# Patient Record
Sex: FEMALE | Race: AMERICAN INDIAN OR ALASKA NATIVE
[De-identification: names, ages, dates, MRNs, and addresses within clinical notes are randomized per-mention and may not be internally consistent; named-entity substitution may affect disease eponyms.]

---

## 2018-06-11 ENCOUNTER — HOSPITAL ENCOUNTER (EMERGENCY)
Dept: HOSPITAL 5 - ED | Age: 78
Discharge: LEFT BEFORE DISCHARGE | End: 2018-06-11
Payer: MEDICARE

## 2018-06-11 VITALS — SYSTOLIC BLOOD PRESSURE: 141 MMHG | DIASTOLIC BLOOD PRESSURE: 62 MMHG

## 2018-06-11 DIAGNOSIS — Z90.49: ICD-10-CM

## 2018-06-11 DIAGNOSIS — Z95.0: ICD-10-CM

## 2018-06-11 DIAGNOSIS — Z88.0: ICD-10-CM

## 2018-06-11 DIAGNOSIS — Z53.21: ICD-10-CM

## 2018-06-11 DIAGNOSIS — R53.1: ICD-10-CM

## 2018-06-11 DIAGNOSIS — T80.89XA: Primary | ICD-10-CM

## 2018-09-23 ENCOUNTER — HOSPITAL ENCOUNTER (INPATIENT)
Dept: HOSPITAL 5 - ED | Age: 78
LOS: 3 days | DRG: 871 | End: 2018-09-26
Attending: INTERNAL MEDICINE | Admitting: INTERNAL MEDICINE
Payer: MEDICARE

## 2018-09-23 DIAGNOSIS — Z87.891: ICD-10-CM

## 2018-09-23 DIAGNOSIS — G93.41: ICD-10-CM

## 2018-09-23 DIAGNOSIS — Z95.5: ICD-10-CM

## 2018-09-23 DIAGNOSIS — Z95.0: ICD-10-CM

## 2018-09-23 DIAGNOSIS — Z79.899: ICD-10-CM

## 2018-09-23 DIAGNOSIS — A41.9: Primary | ICD-10-CM

## 2018-09-23 DIAGNOSIS — J18.1: ICD-10-CM

## 2018-09-23 DIAGNOSIS — K52.9: ICD-10-CM

## 2018-09-23 DIAGNOSIS — E87.6: ICD-10-CM

## 2018-09-23 DIAGNOSIS — E83.42: ICD-10-CM

## 2018-09-23 DIAGNOSIS — Z85.79: ICD-10-CM

## 2018-09-23 DIAGNOSIS — E11.22: ICD-10-CM

## 2018-09-23 DIAGNOSIS — Z88.0: ICD-10-CM

## 2018-09-23 DIAGNOSIS — I25.2: ICD-10-CM

## 2018-09-23 DIAGNOSIS — I21.A1: ICD-10-CM

## 2018-09-23 DIAGNOSIS — J96.01: ICD-10-CM

## 2018-09-23 DIAGNOSIS — N17.0: ICD-10-CM

## 2018-09-23 DIAGNOSIS — N18.9: ICD-10-CM

## 2018-09-23 DIAGNOSIS — I13.0: ICD-10-CM

## 2018-09-23 DIAGNOSIS — F03.90: ICD-10-CM

## 2018-09-23 DIAGNOSIS — D64.9: ICD-10-CM

## 2018-09-23 DIAGNOSIS — I25.10: ICD-10-CM

## 2018-09-23 DIAGNOSIS — E87.8: ICD-10-CM

## 2018-09-23 DIAGNOSIS — E43: ICD-10-CM

## 2018-09-23 DIAGNOSIS — Z79.82: ICD-10-CM

## 2018-09-23 DIAGNOSIS — L89.159: ICD-10-CM

## 2018-09-23 DIAGNOSIS — D61.818: ICD-10-CM

## 2018-09-23 DIAGNOSIS — Z90.49: ICD-10-CM

## 2018-09-23 DIAGNOSIS — K92.2: ICD-10-CM

## 2018-09-23 DIAGNOSIS — I48.92: ICD-10-CM

## 2018-09-23 DIAGNOSIS — R65.21: ICD-10-CM

## 2018-09-23 DIAGNOSIS — N39.0: ICD-10-CM

## 2018-09-23 DIAGNOSIS — I50.9: ICD-10-CM

## 2018-09-23 DIAGNOSIS — I46.9: ICD-10-CM

## 2018-09-23 DIAGNOSIS — I49.3: ICD-10-CM

## 2018-09-23 DIAGNOSIS — Z90.710: ICD-10-CM

## 2018-09-23 DIAGNOSIS — I48.0: ICD-10-CM

## 2018-09-23 DIAGNOSIS — I82.C22: ICD-10-CM

## 2018-09-23 DIAGNOSIS — E88.09: ICD-10-CM

## 2018-09-23 PROCEDURE — 96375 TX/PRO/DX INJ NEW DRUG ADDON: CPT

## 2018-09-23 PROCEDURE — 31500 INSERT EMERGENCY AIRWAY: CPT

## 2018-09-23 PROCEDURE — 94760 N-INVAS EAR/PLS OXIMETRY 1: CPT

## 2018-09-23 PROCEDURE — 83690 ASSAY OF LIPASE: CPT

## 2018-09-23 PROCEDURE — A9558 XE133 XENON 10MCI: HCPCS

## 2018-09-23 PROCEDURE — 85025 COMPLETE CBC W/AUTO DIFF WBC: CPT

## 2018-09-23 PROCEDURE — 80048 BASIC METABOLIC PNL TOTAL CA: CPT

## 2018-09-23 PROCEDURE — 74176 CT ABD & PELVIS W/O CONTRAST: CPT

## 2018-09-23 PROCEDURE — 82140 ASSAY OF AMMONIA: CPT

## 2018-09-23 PROCEDURE — 96365 THER/PROPH/DIAG IV INF INIT: CPT

## 2018-09-23 PROCEDURE — 93970 EXTREMITY STUDY: CPT

## 2018-09-23 PROCEDURE — 78582 LUNG VENTILAT&PERFUS IMAGING: CPT

## 2018-09-23 PROCEDURE — 87205 SMEAR GRAM STAIN: CPT

## 2018-09-23 PROCEDURE — 82803 BLOOD GASES ANY COMBINATION: CPT

## 2018-09-23 PROCEDURE — 85610 PROTHROMBIN TIME: CPT

## 2018-09-23 PROCEDURE — 80053 COMPREHEN METABOLIC PANEL: CPT

## 2018-09-23 PROCEDURE — 96376 TX/PRO/DX INJ SAME DRUG ADON: CPT

## 2018-09-23 PROCEDURE — 82962 GLUCOSE BLOOD TEST: CPT

## 2018-09-23 PROCEDURE — 85730 THROMBOPLASTIN TIME PARTIAL: CPT

## 2018-09-23 PROCEDURE — 85007 BL SMEAR W/DIFF WBC COUNT: CPT

## 2018-09-23 PROCEDURE — 93010 ELECTROCARDIOGRAM REPORT: CPT

## 2018-09-23 PROCEDURE — 36600 WITHDRAWAL OF ARTERIAL BLOOD: CPT

## 2018-09-23 PROCEDURE — 93306 TTE W/DOPPLER COMPLETE: CPT

## 2018-09-23 PROCEDURE — 82550 ASSAY OF CK (CPK): CPT

## 2018-09-23 PROCEDURE — 85379 FIBRIN DEGRADATION QUANT: CPT

## 2018-09-23 PROCEDURE — 71045 X-RAY EXAM CHEST 1 VIEW: CPT

## 2018-09-23 PROCEDURE — 94002 VENT MGMT INPAT INIT DAY: CPT

## 2018-09-23 PROCEDURE — 83880 ASSAY OF NATRIURETIC PEPTIDE: CPT

## 2018-09-23 PROCEDURE — 87070 CULTURE OTHR SPECIMN AEROBIC: CPT

## 2018-09-23 PROCEDURE — 70450 CT HEAD/BRAIN W/O DYE: CPT

## 2018-09-23 PROCEDURE — 83735 ASSAY OF MAGNESIUM: CPT

## 2018-09-23 PROCEDURE — 93005 ELECTROCARDIOGRAM TRACING: CPT

## 2018-09-23 PROCEDURE — 80061 LIPID PANEL: CPT

## 2018-09-23 PROCEDURE — 92950 HEART/LUNG RESUSCITATION CPR: CPT

## 2018-09-23 PROCEDURE — 81001 URINALYSIS AUTO W/SCOPE: CPT

## 2018-09-23 PROCEDURE — 36415 COLL VENOUS BLD VENIPUNCTURE: CPT

## 2018-09-23 PROCEDURE — 87040 BLOOD CULTURE FOR BACTERIA: CPT

## 2018-09-23 PROCEDURE — 84484 ASSAY OF TROPONIN QUANT: CPT

## 2018-09-23 PROCEDURE — A9540 TC99M MAA: HCPCS

## 2018-09-23 PROCEDURE — 82553 CREATINE MB FRACTION: CPT

## 2018-09-23 NOTE — EMERGENCY DEPARTMENT REPORT
HPI





- General


Time Seen by Provider: 18 22:02





- HPI


HPI: 





Room 18





The patient is an 78-year-old female presenting with a chief complaint of 

altered mental status.  The patient is a resident at a nursing home and family 

states the patient last known well time was 2018.  The daughter states 

she did not check on the patient yesterday but today at 11:30 shows that the 

patient was not behaving like herself that she was not speaking and appears 

over her eyes rolled in the back of her head.  Patient opens eyes and makes eye 

contact but will not answer questions to provide history.





Location: Mental status


Duration: [See above]


Quality: Altered


Severity: Moderate


Modifying factors: [see above]


Context: [see above]


Mode of transportation: [not driving]





ED Past Medical Hx





- Past Medical History


Hx Hypertension: Yes


Hx Heart Attack/AMI: Yes


Hx Congestive Heart Failure: Yes


Hx Diabetes: Yes (diet)


Hx of Cancer: Yes (multiple myeloma)


Additional medical history: Heart problems; multiple myeloma





- Surgical History


Hx Coronary Stent: Yes


Hx Appendectomy: Yes


Additional Surgical History: Pacemaker, Hysterectomy, Bladder surgery; jaw 

surgery





- Family History


Family history: no significant





- Social History


Smoking Status: Former Smoker (none 20 years)


Substance Use Type: None





- Medications


Home Medications: 


 Home Medications











 Medication  Instructions  Recorded  Confirmed  Last Taken  Type


 


Aspirin [Aspirin BABY CHEW TAB] 81 mg PO QDAY 01/21/15 06/11/18 Unknown History


 


DULoxetine [Cymbalta] 60 mg PO DAILY 01/21/15 06/11/18 Unknown History


 


Gabapentin [Neurontin] 300 mg PO BID 01/21/15 06/11/18 Unknown History


 


Topiramate [Topamax TAB] 25 mg PO HS 01/21/15 06/11/18 Unknown History


 


amLODIPine [Norvasc] 5 mg PO DAILY 01/21/15 06/11/18 Unknown History


 


ALPRAZolam [Xanax TAB] 0.25 mg PO DAILY PRN 18 Unknown History


 


Acidophilus Lactobacillus 1 cap PO BID 18 Unknown History


 


Atorvastatin Calcium 80 mg PO HS 18 Unknown History


 


Carvedilol [Coreg] 3.125 mg PO DAILY 18 Unknown History


 


Dextran 70/Hypromellose 1 drop OU BID 18 Unknown History





[Artificial Tears]     


 


Docusate Sodium [Colace CAP] 100 mg PO DAILY 18 Unknown History


 


Norvasc 5 mg PO DAILY 18 Unknown History


 


Sennosides [Senna] 2 tab PO BID 18 Unknown History


 


Ventolin Hfa 2 puff INHALATION Q4H PRN 18 Unknown History


 


Xanax 0.25 mg PO HS 18 Unknown History


 


HYDROcodone/APAP 5-325 [Norco 1 each PO Q4H PRN #10 tablet 18  Unknown Rx





5-325 mg TAB]     














ED Review of Systems


ROS: 


Stated complaint: RESPIRATORY DIFFICULTY


Other details as noted in HPI





Comment: Unobtainable due to pts medical conditions





Physical Exam





- Physical Exam


Physical Exam: 





GENERAL: The patient is well-developed well-nourished female lying on stretcher 

not appearing to be in acute distress. []


HEENT: Normocephalic.  Atraumatic.  Extraocular motions are intact.  Patient 

has moist mucous membranes.


NECK: Supple.  Trachea midline


CHEST/LUNGS: Clear to auscultation.  There is no respiratory distress noted.


HEART/CARDIOVASCULAR: Irregular but rapid.  There is tachycardia.  There is no 

gallop rub or murmur.


ABDOMEN: Abdomen is soft, with tenderness to palpation in the right upper 

quadrant and left upper quadrant.  Patient has normal bowel sounds.  There is 

no abdominal distention.


SKIN: There is no rash. There is no diaphoresis.


NEURO: The patient is awake but does not answer questions.  Patient will not 

follow commands for neurologic exam.


MUSCULOSKELETAL:  There is no evidence of acute injury.





- Central Line Placement


  ** Right Femoral


Consent Obtained: verbal consent


Time Out Performed: No


Patient Placed on Monitor/Pulse Ox: Yes


MD Prep: mask, gown, gloves


Central Line Prep: Chlorhexidine scrub


Local Anesthesia Used: Lidocaine 1%


Amount of Anesthesia Used (mls): 5


Ultrasound Used for Placement: No


Central Line Lumen Inserted: triple


Bloods Obtained for Lab: Yes


Central Line Position: good blood return, all ports aspirated, flus, other (

line secured with adhesive)


Dressing Applied: Tegaderm


Patient Tolerated Procedure: no complications


Complications: none





ED Medical Decision Making





- Lab Data


Result diagrams: 


 18 00:05





 18 00:05





 Laboratory Tests











  18





  00:05 00:05 00:05


 


WBC  2.9 L  


 


RBC  2.51 L  


 


Hgb  7.6 L  


 


Hct  22.7 L  


 


MCV  91  


 


MCH  30  


 


MCHC  33  


 


RDW  17.8 H  


 


Plt Count  47 L  


 


PT   19.3 H 


 


INR   1.57 H 


 


APTT   36.4 


 


D-Dimer   3469.10 H 


 


Sodium    146 H


 


Potassium    3.4 L


 


Chloride    107.2 H


 


Carbon Dioxide    18 L


 


Anion Gap    24


 


BUN    33 H


 


Creatinine    1.8 H


 


Estimated GFR    33


 


BUN/Creatinine Ratio    18


 


Glucose    155 H


 


Calcium    7.5 L


 


Total Bilirubin    1.90 H


 


AST    29


 


ALT    17


 


Alkaline Phosphatase    73


 


Total Creatine Kinase   


 


CK-MB (CK-2)   


 


CK-MB (CK-2) Rel Index   


 


Troponin T   


 


NT-Pro-B Natriuret Pep   


 


Total Protein    4.6 L


 


Albumin    2.3 L


 


Albumin/Globulin Ratio    1.0


 


Triglycerides   


 


Cholesterol   


 


LDL Cholesterol Direct   


 


HDL Cholesterol   


 


Cholesterol/HDL Ratio   


 


Lipase   














  18





  00:05


 


WBC 


 


RBC 


 


Hgb 


 


Hct 


 


MCV 


 


MCH 


 


MCHC 


 


RDW 


 


Plt Count 


 


PT 


 


INR 


 


APTT 


 


D-Dimer 


 


Sodium 


 


Potassium 


 


Chloride 


 


Carbon Dioxide 


 


Anion Gap 


 


BUN 


 


Creatinine 


 


Estimated GFR 


 


BUN/Creatinine Ratio 


 


Glucose 


 


Calcium 


 


Total Bilirubin 


 


AST 


 


ALT 


 


Alkaline Phosphatase 


 


Total Creatine Kinase  301 H


 


CK-MB (CK-2)  1.9


 


CK-MB (CK-2) Rel Index  0.6


 


Troponin T  0.180 H*


 


NT-Pro-B Natriuret Pep  9064 H


 


Total Protein 


 


Albumin 


 


Albumin/Globulin Ratio 


 


Triglycerides  181 H


 


Cholesterol  93


 


LDL Cholesterol Direct  28 L


 


HDL Cholesterol  35 L


 


Cholesterol/HDL Ratio  2.65


 


Lipase  7 L














- EKG Data


-: EKG Interpreted by Me


Rate: tachycardia





- EKG Data


When compared to previous EKG there are: previous EKG unavailable


Interpretation: other (on monitor patient's rate appears irregular consistent 

with A. fib with RVR)





- Radiology Data


Radiology results: pending (VQ scan pending (awaiting tech)), report reviewed (

CT abdomen and pelvis, CT head), image reviewed (chest x-ray, CT abdomen and 

pelvis, CT head)


interpreted by me: 





Chest x-ray- patient is slightly rotated.  Bibasilar increased densities versus 

breast shadows.  No pneumothorax





[facility] 


[address] [place] [address] 





Cat Scan Report 


Signed 





Patient: MILES HSIEH MR#: [id number] 


: 1940 Acct:[id number] 


Age/Sex: 78 / F ADM Date: 18 


Loc: ED 


Attending Dr: 








Ordering Physician: VIN BURLESON MD 


Date of Service: 18 


Procedure(s): CT abdomen pelvis wo con 


Accession Number(s): [id number] 





cc: VIN BURLESON MD 








FINAL REPORT 





PROCEDURE: CT ABDOMEN PELVIS WO CON 





TECHNIQUE: Computerized axial tomography of the abdomen and 


pelvis was performed without intravenous contrast. This study is 


performed without intravascular contrast material and its 


sensitivity for abdominal and pelvic pathology, including 


neoplasms, inflammation, abscess, free fluid, thrombosis, 


arterial dissection and infarction, is reduced compared with a 


contrast enhanced study. 





HISTORY: RUQ, LUQ and epigastric tenderness 





COMPARISON: No prior studies are available for comparison. 





FINDINGS: 


Visualized lower thorax: There is atelectasis at the lung bases. 


There is a right lower lobe infiltrate. There are bilateral 


pleural effusions.. 





Liver: Normal size and attenuation. 





Spleen: Normal size and attenuation. 





Gallbladder and biliary system: There is cholelithiasis. There is 


no specific evidence of cholecystitis or biliary ductal 


dilatation.. 





Pancreas: Normal. 





Adrenals: Normal. 





Kidneys: There are no kidney stones or ureteral stones. There is 


no hydronephrosis. There is a 2 centimeter hypodense area in the 


left kidney which could be a cyst.. 





GI tract: There is mucosal thickening of the colon indicating 


colitis. This is nonspecific and could be infectious or 


inflammatory. There is no specific evidence of ischemic colitis. 


There is no obstruction or perforation. The stomach and small 


bowel are unremarkable. The appendix is not identified.. 





Lymph nodes and mesentery: Normal. 





Vasculature: There is calcified plaque in the abdominal aorta. 


There is no aneurysm.. 





Bladder: Normal. 





Reproductive organs: There has been hysterectomy. 





Peritoneum: There is no ascites or free air. There is no abscess 


or adenopathy.. 





Musculoskeletal structures: There are compression deformities of 


T12 and L1 which could be acute. MRI may be helpful. 





Other: There is generalized subcutaneous edema.. 





IMPRESSION: 


There is cholelithiasis. There is no specific evidence of 


cholecystitis or biliary ductal dilatation. 





There are no kidney stones or ureteral stones. There is no 


hydronephrosis. There is a 2 centimeter hypodense area in the 


left kidney which could be a cyst.. 





There is mucosal thickening of the colon indicating colitis. This 


is nonspecific and could be infectious or inflammatory. There is 


no specific evidence of ischemic colitis. There is no obstruction 


or perforation. 





There has been hysterectomy. 





There is no ascites or free air. There is no abscess or 


adenopathy.. 





There are compression deformities of T12 and L1 which could be 


acute. MRI may be helpful. 











There is atelectasis at the lung bases. There is a right lower 


lobe infiltrate. There are bilateral pleural effusions.. 











Transcribed By: CO 


Dictated By: KAUSHAL PEREZ MD 


Electronically Authenticated By: KAUSHAL PEREZ MD 


Signed Date/Time: 18 











DD/DT: 18 


TD/TT: 18











[facility] 


[address] [place] [address] 





Cat Scan Report 


Signed 





Patient: MILES HSIEH MR#: [id number] 


: 1940 Acct:[id number] 


Age/Sex: 78 / F ADM Date: 18 


Loc: ED 


Attending Dr: 








Ordering Physician: VIN BURLESON MD 


Date of Service: 18 


Procedure(s): CT head/brain wo con 


Accession Number(s): [id number] 





cc: VIN BURLESON MD 








FINAL REPORT 





PROCEDURE: CT HEAD/BRAIN WO CON 





TECHNIQUE: Computerized tomography of the head was performed 


without contrast material. 





HISTORY: altered mental status 





COMPARISON: No prior studies are available for comparison. 





FINDINGS: 


Skull and scalp: Normal. 





Paranasal sinuses: There is fluid in the sphenoid sinus.. 





Ventricles and subarachnoid spaces: There is mild central and 


cortical atrophy. There is no hydrocephalus or asymmetry.. 





Cerebrum: No evidence of hemorrhage, acute infarction or mass. 


There is mild chronic deep white matter ischemic gliosis. 





Cerebellum and brainstem: No evidence of hemorrhage, acute 


infarction or mass. 





Vasculature: Normal. 





Comments: None. 





IMPRESSION: 


The there are chronic involutional and ischemic changes. There is 


no hemorrhage or edema. 





There is sphenoid sinusitis. 











Transcribed By: CO 


Dictated By: KAUSHAL PEREZ MD 


Electronically Authenticated By: KAUSHAL PEREZ MD 


Signed Date/Time: 18 











DD/DT: 18 


TD/TT: 18





[facility] 


[address] [place] [address] 





XRay Report 


Signed 





Patient: MILES HSIEH MR#: [id number] 


: 1940 Acct:[id number] 


Age/Sex: 78 / F ADM Date: 18 


Loc: ED 


Attending Dr: 








Ordering Physician: VIN BURLESON MD 


Date of Service: 18 


Procedure(s): XR chest 1V ap 


Accession Number(s): [id number] 





cc: VIN BURLESON MD 





Fluoro Time In Minutes: 





FINAL REPORT 





PROCEDURE: XR CHEST 1V AP 





TECHNIQUE: Chest radiograph anteroposterior view. CPT 75081 











HISTORY: tachycardia, elevated d-dimer 





COMPARISON: 2018 





FINDINGS: 


Heart is enlarged. 





There are bilateral lower lobe infiltrates and small effusions. 


There is no pneumothorax. Bony and soft tissue structures are 


normal. 





IMPRESSION: 








Heart is enlarged. 





There are bilateral lower lobe infiltrates and small effusions. 


There is no pneumothorax. 





. 











Transcribed By: CO 


Dictated By: KAUSHAL PEREZ MD 


Electronically Authenticated By: KAUSHAL PEREZ MD 


Signed Date/Time: 18 











DD/DT: 18 


TD/TT: 18





- Differential Diagnosis


sepsis, CVA, altered mental status, A. fib with RVR


Critical Care Time: Yes


Critical care time in (mins) excluding proc time.: 30


Critical care attestation.: 


If time is entered above; I have spent that time in minutes in the direct care 

of this critically ill patient, excluding procedure time.








ED Disposition


Clinical Impression: 


 Altered mental status, Elevated troponin, Tachycardia, Pancytopenia, Renal 

insufficiency, Pneumonia





Disposition:  OP ADMIT IP TO THIS HOSP


Is pt being admited?: Yes


Does the pt Need Aspirin: No


Condition: Serious


Instructions:  Bacterial Pneumonia (ED)


Referrals: 


PRIMARY CARE,MD [Primary Care Provider] - 3-5 Days


Time of Disposition: 02:16 (hospitalist paged (Dr Fregoso))

## 2018-09-24 LAB
ALBUMIN SERPL-MCNC: 2.3 G/DL (ref 3.9–5)
ALT SERPL-CCNC: 17 UNITS/L (ref 7–56)
ANISOCYTOSIS BLD QL SMEAR: (no result)
APTT BLD: 36.4 SEC. (ref 24.2–36.6)
BACTERIA #/AREA URNS HPF: (no result) /HPF
BAND NEUTROPHILS # (MANUAL): 0.9 K/MM3
BILIRUB UR QL STRIP: (no result)
BLOOD UR QL VISUAL: (no result)
BUN SERPL-MCNC: 33 MG/DL (ref 7–17)
BUN SERPL-MCNC: 41 MG/DL (ref 7–17)
BUN/CREAT SERPL: 18 %
BUN/CREAT SERPL: 22 %
CALCIUM SERPL-MCNC: 7.2 MG/DL (ref 8.4–10.2)
CALCIUM SERPL-MCNC: 7.5 MG/DL (ref 8.4–10.2)
DACRYOCYTES BLD QL SMEAR: (no result)
HCT VFR BLD CALC: 22.7 % (ref 30.3–42.9)
HDLC SERPL-MCNC: 35 MG/DL (ref 40–59)
HEMOLYSIS INDEX: 2
HEMOLYSIS INDEX: 4
HGB BLD-MCNC: 7.6 GM/DL (ref 10.1–14.3)
INR PPP: 1.57 (ref 0.87–1.13)
LIPASE SERPL-CCNC: 7 UNITS/L (ref 13–60)
MCH RBC QN AUTO: 30 PG (ref 28–32)
MCHC RBC AUTO-ENTMCNC: 33 % (ref 30–34)
MCV RBC AUTO: 91 FL (ref 79–97)
MUCOUS THREADS #/AREA URNS HPF: (no result) /HPF
MYELOCYTES # (MANUAL): 0.1 K/MM3
PH UR STRIP: 5 [PH] (ref 5–7)
PLATELET # BLD: 47 K/MM3 (ref 140–440)
PROMYELOCYTES # (MANUAL): 0.1 K/MM3
RBC # BLD AUTO: 2.51 M/MM3 (ref 3.65–5.03)
RBC #/AREA URNS HPF: 4 /HPF (ref 0–6)
TOTAL CELLS COUNTED BLD: 100
UROBILINOGEN UR-MCNC: 2 MG/DL (ref ?–2)
WBC #/AREA URNS HPF: 18 /HPF (ref 0–6)

## 2018-09-24 PROCEDURE — 06HY33Z INSERTION OF INFUSION DEVICE INTO LOWER VEIN, PERCUTANEOUS APPROACH: ICD-10-PCS | Performed by: INTERNAL MEDICINE

## 2018-09-24 RX ADMIN — HEPARIN SODIUM SCH UNIT: 5000 INJECTION, SOLUTION INTRAVENOUS; SUBCUTANEOUS at 11:00

## 2018-09-24 RX ADMIN — METOPROLOL TARTRATE SCH: 25 TABLET, FILM COATED ORAL at 17:35

## 2018-09-24 RX ADMIN — HEPARIN SODIUM SCH UNIT: 5000 INJECTION, SOLUTION INTRAVENOUS; SUBCUTANEOUS at 22:40

## 2018-09-24 RX ADMIN — MORPHINE SULFATE PRN MG: 2 INJECTION, SOLUTION INTRAMUSCULAR; INTRAVENOUS at 03:22

## 2018-09-24 NOTE — CAT SCAN REPORT
FINAL REPORT



PROCEDURE:  CT ABDOMEN PELVIS WO CON



TECHNIQUE:  Computerized axial tomography of the abdomen and

pelvis was performed without intravenous contrast. This study is

performed without intravascular contrast material and its

sensitivity for abdominal and pelvic pathology, including

neoplasms, inflammation, abscess, free fluid, thrombosis,

arterial dissection and infarction, is reduced compared with a

contrast enhanced study. 



HISTORY:  RUQ, LUQ and epigastric tenderness 



COMPARISON:  No prior studies are available for comparison.



FINDINGS:  

Visualized lower thorax: There is atelectasis at the lung bases.

There is a right lower lobe infiltrate. There are bilateral

pleural effusions..



Liver: Normal size and attenuation.



Spleen: Normal size and attenuation.



Gallbladder and biliary system: There is cholelithiasis. There is

no specific evidence of cholecystitis or biliary ductal

dilatation..



Pancreas: Normal.



Adrenals: Normal.



Kidneys: There are no kidney stones or ureteral stones. There is

no hydronephrosis. There is a 2 centimeter hypodense area in the

left kidney which could be a cyst..



GI tract: There is mucosal thickening of the colon indicating

colitis. This is nonspecific and could be infectious or

inflammatory. There is no specific evidence of ischemic colitis.

There is no obstruction or perforation. The stomach and small

bowel are unremarkable. The appendix is not identified..



Lymph nodes and mesentery: Normal.



Vasculature: There is calcified plaque in the abdominal aorta.

There is no aneurysm..



Bladder: Normal.



Reproductive organs: There has been hysterectomy.



Peritoneum: There is no ascites or free air. There is no abscess

or adenopathy..



Musculoskeletal structures: There are compression deformities of

T12 and L1 which could be acute. MRI may be helpful. 



Other: There is generalized subcutaneous edema..



IMPRESSION:  

There is cholelithiasis. There is no specific evidence of

cholecystitis or biliary ductal dilatation.



There are no kidney stones or ureteral stones. There is no

hydronephrosis. There is a 2 centimeter hypodense area in the

left kidney which could be a cyst..



There is mucosal thickening of the colon indicating colitis. This

is nonspecific and could be infectious or inflammatory. There is

no specific evidence of ischemic colitis. There is no obstruction

or perforation. 



There has been hysterectomy.



There is no ascites or free air. There is no abscess or

adenopathy..



There are compression deformities of T12 and L1 which could be

acute. MRI may be helpful. 







There is atelectasis at the lung bases. There is a right lower

lobe infiltrate. There are bilateral pleural effusions..

## 2018-09-24 NOTE — CAT SCAN REPORT
FINAL REPORT



PROCEDURE:  CT HEAD/BRAIN WO CON



TECHNIQUE:  Computerized tomography of the head was performed

without contrast material. 



HISTORY:  altered mental status 



COMPARISON:  No prior studies are available for comparison.



FINDINGS:  

Skull and scalp: Normal.



Paranasal sinuses: There is fluid in the sphenoid sinus..



Ventricles and subarachnoid spaces: There is mild central and

cortical atrophy. There is no hydrocephalus or asymmetry..



Cerebrum: No evidence of hemorrhage, acute infarction or mass.

There is mild chronic deep white matter ischemic gliosis. 



Cerebellum and brainstem: No evidence of hemorrhage, acute

infarction or mass.



Vasculature: Normal.



Comments: None.



IMPRESSION:  

The there are chronic involutional and ischemic changes. There is

no hemorrhage or edema.



There is sphenoid sinusitis.

## 2018-09-24 NOTE — CONSULTATION
History of Present Illness


Consult date: 09/24/18


Requesting physician: AMY J KOCHERLA


Consult reason: congestive heart failure, elevated troponin, tachycardia


History of present illness: 


The patient is an 78-year-old female nursing home resident who presented for 

evaluation of altered mental status. Pt is confused and lethargic with no 

family at bedside on evaluation and thus HPI is obtained per the chart.  The 

patient's family states the patient's last known well time was 09/21/2018.  The 

daughter states she did not check on the patient yesterday but today at 11:30 

she noted that the patient was not behaving like herself that she was not 

speaking and appear to have her eyes rolled in the back of her head.  Patient 

opens eyes and makes eye contact but will not answer questions to provide 

history. Following arrival, pt was noted to be in apparent SVT and was 

initiated on cardizem gtt. On evaluation, she is in NSR with frequent PACs. 











Past History


Past Medical History: other (MILAGROS)


Past Surgical History: Other (MILAGROS)


Social history: other (MILAGROS)





Medications and Allergies


 Allergies











Allergy/AdvReac Type Severity Reaction Status Date / Time


 


Penicillins Allergy  Rash Verified 01/21/15 12:13











 Home Medications











 Medication  Instructions  Recorded  Confirmed  Last Taken  Type


 


Aspirin [Aspirin BABY CHEW TAB] 81 mg PO QDAY 01/21/15 09/24/18 Unknown History


 


DULoxetine [Cymbalta] 60 mg PO DAILY 01/21/15 09/24/18 Unknown History


 


Gabapentin [Neurontin] 300 mg PO BID 01/21/15 09/24/18 Unknown History


 


Topiramate [Topamax TAB] 25 mg PO HS 01/21/15 09/24/18 Unknown History


 


amLODIPine [Norvasc] 5 mg PO DAILY 01/21/15 09/24/18 Unknown History


 


ALPRAZolam [Xanax TAB] 0.25 mg PO DAILY PRN 06/11/18 09/24/18 Unknown History


 


Acidophilus Lactobacillus 1 cap PO BID 06/11/18 09/24/18 Unknown History


 


Atorvastatin Calcium 80 mg PO HS 06/11/18 09/24/18 Unknown History


 


Carvedilol [Coreg] 3.125 mg PO DAILY 06/11/18 09/24/18 Unknown History


 


Dextran 70/Hypromellose 1 drop OU BID 06/11/18 09/24/18 Unknown History





[Artificial Tears]     


 


Docusate Sodium [Colace CAP] 100 mg PO DAILY 06/11/18 09/24/18 Unknown History


 


Norvasc 5 mg PO DAILY 06/11/18 09/24/18 Unknown History


 


Sennosides [Senna] 2 tab PO BID 06/11/18 09/24/18 Unknown History


 


Ventolin Hfa 2 puff INHALATION Q4H PRN 06/11/18 09/24/18 Unknown History


 


Xanax 0.25 mg PO HS 06/11/18 09/24/18 Unknown History


 


HYDROcodone/APAP 5-325 [Norco 1 each PO Q4H PRN #10 tablet 06/23/18 09/24/18 

Unknown Rx





5-325 mg TAB]     











Active Meds: 


Active Medications





Acetaminophen (Tylenol)  650 mg PO Q4H PRN


   PRN Reason: Fever >101


Albuterol (Proventil)  2.5 mg IH Q4HRT PRN


   PRN Reason: Shortness Of Breath


Alprazolam (Xanax)  0.25 mg PO DAILY PRN


   PRN Reason: Anxiety


Aspirin (Baby Aspirin)  81 mg PO QDAY [facility]


Atorvastatin Calcium (Lipitor)  80 mg PO QHS [facility]


Carvedilol (Coreg)  3.125 mg PO DAILY [facility]


Docusate Sodium (Colace)  100 mg PO DAILY [facility]


Duloxetine HCl (Cymbalta)  60 mg PO DAILY [facility]


Heparin Sodium (Porcine) (Heparin)  5,000 unit SUB-Q Q12HR [facility]


   Last Admin: 09/24/18 11:00 Dose:  5,000 unit


Diltiazem HCl 100 mg/ Dextrose  100 mls @ 5 mls/hr IV AS DIRECT [facility]; Protocol


   Last Admin: 09/23/18 23:55 Dose:  5 mg/hr, 5 mls/hr


Levofloxacin/Dextrose (Levaquin 250mg/50ml)  250 mg in 50 mls @ 50 mls/hr IV 

Q24HR [facility]; Protocol


Magnesium Sulfate (Magnesium Sulfate 2gm/50ml)  2 gm in 50 mls @ 25 mls/hr IV 

ONCE ONE


   Stop: 09/24/18 18:29


Morphine Sulfate (Morphine)  2 mg IV Q4H PRN


   PRN Reason: Pain, Moderate (4-6)


   Last Admin: 09/24/18 03:22 Dose:  2 mg


Ondansetron HCl (Zofran)  4 mg IV Q8H PRN


   PRN Reason: Nausea And Vomiting


Topiramate (Topamax)  25 mg PO HS [facility]











Review of Systems


ROS unobtainable: due to mental status





Physical Examination


 Vital Signs











Pulse Resp Pulse Ox


 


 165 H  22   98 


 


 09/23/18 23:36  09/23/18 23:36  09/23/18 23:36











General appearance: other (confused, lethargic, no apparent distress)


HEENT: Positive: PERRL


Neck: Positive: neck supple, trachea midline


Cardiac: Positive: Regular Rhythm, S1/S2


Lungs: Positive: Decreased Breath Sounds (anterior)


Neuro: Positive: Other (lethargic, confused)


Abdomen: Negative: Tender


Skin: Negative: Rash


Musculoskeletal: Fluid Collection (generalized )


Extremities: Present: +2 Edema (BLE)





Results





 09/24/18 00:05





 09/24/18 14:55


 Cardiac Enzymes











  09/24/18 09/24/18 09/24/18 Range/Units





  00:05 00:05 07:42 


 


AST  29    (5-40)  units/L


 


CK-MB (CK-2)   1.9  2.4  (0.0-4.0)  ng/mL














  09/24/18 Range/Units





  12:08 


 


AST   (5-40)  units/L


 


CK-MB (CK-2)  3.1  (0.0-4.0)  ng/mL








 Coagulation











  09/24/18 Range/Units





  00:05 


 


PT  19.3 H  (12.2-14.9)  Sec.


 


INR  1.57 H  (0.87-1.13)  


 


APTT  36.4  (24.2-36.6)  Sec.








 Lipids











  09/24/18 Range/Units





  00:05 


 


Triglycerides  181 H  (2-149)  mg/dL


 


Cholesterol  93  ()  mg/dL


 


HDL Cholesterol  35 L  (40-59)  mg/dL


 


Cholesterol/HDL Ratio  2.65  %








 CBC











  09/24/18 Range/Units





  00:05 


 


WBC  2.9 L  (4.5-11.0)  K/mm3


 


RBC  2.51 L  (3.65-5.03)  M/mm3


 


Hgb  7.6 L  (10.1-14.3)  gm/dl


 


Hct  22.7 L  (30.3-42.9)  %


 


Plt Count  47 L  (140-440)  K/mm3








 Comprehensive Metabolic Panel











  09/24/18 Range/Units





  00:05 


 


Sodium  146 H  (137-145)  mmol/L


 


Potassium  3.4 L  (3.6-5.0)  mmol/L


 


Chloride  107.2 H  ()  mmol/L


 


Carbon Dioxide  18 L  (22-30)  mmol/L


 


BUN  33 H  (7-17)  mg/dL


 


Creatinine  1.8 H  (0.7-1.2)  mg/dL


 


Glucose  155 H  ()  mg/dL


 


Calcium  7.5 L  (8.4-10.2)  mg/dL


 


AST  29  (5-40)  units/L


 


ALT  17  (7-56)  units/L


 


Alkaline Phosphatase  73  ()  units/L


 


Total Protein  4.6 L  (6.3-8.2)  g/dL


 


Albumin  2.3 L  (3.9-5)  g/dL














- Imaging and Cardiology


Echo: pending


EKG: report reviewed, image reviewed





EKG interpretations





- Telemetry


EKG Rhythm: Sinus Tachycardia





- EKG


Sinus rhythms and dysrhythmias: sinus tachycardia





Assessment and Plan


Assessment:


Acute heart failure


SVT --> SR


NSTEMI type II


? UTI


Altered mental status 


Anemia


Thrombocytopenia 


Acute renal failure


Elevated DDimer - V/Q scan normal





Plan:


Obtain echo. 


Optimize HR. 





The patient has been seen in conjunction with Dr. Lugo who agrees with the 

assessment and plan of care.

## 2018-09-24 NOTE — CONSULTATION
History of Present Illness





- Reason for Consult


Consult date: 09/24/18


acute renal failure, chronic renal failure


Requesting physician: AMY J KOCHERLA





Medications and Allergies


 Allergies











Allergy/AdvReac Type Severity Reaction Status Date / Time


 


Penicillins Allergy  Rash Verified 01/21/15 12:13











 Home Medications











 Medication  Instructions  Recorded  Confirmed  Last Taken  Type


 


Aspirin [Aspirin BABY CHEW TAB] 81 mg PO QDAY 01/21/15 09/24/18 Unknown History


 


DULoxetine [Cymbalta] 60 mg PO DAILY 01/21/15 09/24/18 Unknown History


 


Gabapentin [Neurontin] 300 mg PO BID 01/21/15 09/24/18 Unknown History


 


Topiramate [Topamax TAB] 25 mg PO HS 01/21/15 09/24/18 Unknown History


 


amLODIPine [Norvasc] 5 mg PO DAILY 01/21/15 09/24/18 Unknown History


 


ALPRAZolam [Xanax TAB] 0.25 mg PO DAILY PRN 06/11/18 09/24/18 Unknown History


 


Acidophilus Lactobacillus 1 cap PO BID 06/11/18 09/24/18 Unknown History


 


Atorvastatin Calcium 80 mg PO HS 06/11/18 09/24/18 Unknown History


 


Carvedilol [Coreg] 3.125 mg PO DAILY 06/11/18 09/24/18 Unknown History


 


Dextran 70/Hypromellose 1 drop OU BID 06/11/18 09/24/18 Unknown History





[Artificial Tears]     


 


Docusate Sodium [Colace CAP] 100 mg PO DAILY 06/11/18 09/24/18 Unknown History


 


Norvasc 5 mg PO DAILY 06/11/18 09/24/18 Unknown History


 


Sennosides [Senna] 2 tab PO BID 06/11/18 09/24/18 Unknown History


 


Ventolin Hfa 2 puff INHALATION Q4H PRN 06/11/18 09/24/18 Unknown History


 


Xanax 0.25 mg PO HS 06/11/18 09/24/18 Unknown History


 


HYDROcodone/APAP 5-325 [Norco 1 each PO Q4H PRN #10 tablet 06/23/18 09/24/18 

Unknown Rx





5-325 mg TAB]     











Active Meds: 


Active Medications





Acetaminophen (Tylenol)  650 mg PO Q4H PRN


   PRN Reason: Fever >101


Albuterol (Proventil)  2.5 mg IH Q4HRT PRN


   PRN Reason: Shortness Of Breath


Alprazolam (Xanax)  0.25 mg PO DAILY PRN


   PRN Reason: Anxiety


Aspirin (Baby Aspirin)  81 mg PO QDAY [facility]


Atorvastatin Calcium (Lipitor)  80 mg PO QHS [facility]


Carvedilol (Coreg)  3.125 mg PO DAILY [facility]


Docusate Sodium (Colace)  100 mg PO DAILY [facility]


Duloxetine HCl (Cymbalta)  60 mg PO DAILY [facility]


Heparin Sodium (Porcine) (Heparin)  5,000 unit SUB-Q Q12HR [facility]


   Last Admin: 09/24/18 11:00 Dose:  5,000 unit


Diltiazem HCl 100 mg/ Dextrose  100 mls @ 5 mls/hr IV AS DIRECT [facility]; Protocol


   Last Admin: 09/23/18 23:55 Dose:  5 mg/hr, 5 mls/hr


Levofloxacin/Dextrose (Levaquin 250mg/50ml)  250 mg in 50 mls @ 50 mls/hr IV 

Q24HR [facility]; Protocol


Magnesium Sulfate (Magnesium Sulfate 2gm/50ml)  2 gm in 50 mls @ 25 mls/hr IV 

ONCE ONE


   Stop: 09/24/18 18:29


Morphine Sulfate (Morphine)  2 mg IV Q4H PRN


   PRN Reason: Pain, Moderate (4-6)


   Last Admin: 09/24/18 03:22 Dose:  2 mg


Ondansetron HCl (Zofran)  4 mg IV Q8H PRN


   PRN Reason: Nausea And Vomiting


Topiramate (Topamax)  25 mg PO HS [facility]











Exam





- Vital Signs


Vital signs: 


 Vital Signs











Pulse Resp Pulse Ox


 


 165 H  22   98 


 


 09/23/18 23:36  09/23/18 23:36  09/23/18 23:36














Results





- Lab Results





 09/24/18 00:05





 09/24/18 00:05


 Most recent lab results











Calcium  7.5 mg/dL (8.4-10.2)  L  09/24/18  00:05    














Assessment and Plan





--Worsening shortness of breath;


 O2 titrate O2 sats to more than 90%, given neb





--Right lower lobe pneumonia; present on admission


 IV Levaquin, cultures supportive care


Possible aspiration pneumonia, speech and swallow eval





--Acute kidney injury; probably secondary to ATN


Gentle hydration, nephrology following





--Severe hypokalemia; replenishment protocol





--Hypomagnesemia; replenishment protocol





--Elevated D dimers, negative V/Q scan





--Lower extremity, left upper extremity swelling; venous Doppler


To rule out DVT in the setting of elevated D dimers.





--Acute colitis; IV Levaquin, renal dose


Supportive care, consult GI as needed





--Anemia; closely monitor H&H, transfuse as needed


Stool for occult blood[hemoglobin in 6/18 was 11.4]





--Dementia; supportive care





--History of hypertension; patient is currently hypotensive





--Sinus tachycardia/PVCs; patient is on Cardizem drip


Closely monitor





--Positive cardiac enzymes; probably nonspecific


Multiple risk factors, cardiology evaluation





--Severe malnutrition; hypoalbuminemia; supportive care nutrition consult

## 2018-09-24 NOTE — HISTORY AND PHYSICAL REPORT
CHIEF COMPLAINT:  Change in mental status.



HISTORY OF PRESENT ILLNESS:  The patient is an 78-year-old female brought from

the nursing home because of change in mental status.  The patient's daughter

states she went there and noticed that the mother was not talking the way she

used to talk and was confused, but there was no history of fever.  There was no

history of chest pain, nausea or vomiting.  Also, daughter states she noticed

that her mother's eyes were rolled back and patient was brought over for

evaluation in the Emergency Room.



PAST MEDICAL HISTORY:  Pertinent for hypertension, coronary artery disease,

status post myocardial infarction, congestive heart failure, diabetes mellitus,

multiple myeloma.



PAST SURGICAL HISTORY:  Pertinent for appendectomy, coronary artery stent

placement, pacemaker placement, hysterectomy, bladder surgery and jaw surgery.



FAMILY HISTORY:  Family history is noncontributory.



SOCIAL HISTORY:  The patient stays at a nursing home, does not smoke currently,

used to smoke about 20 years ago.  Does not drink alcohol and does not use

illicit drugs.



MEDICATIONS:  The patient is on the following medications:  Aspirin 81 mg daily,

Cymbalta 60 mg daily, gabapentin 300 mg twice daily, Topamax 25 mg at bedtime,

Norvasc 5 mg by mouth daily, Xanax 0.25 mg by mouth daily as needed for anxiety,

acidophilus lactobacillus 1 caps by mouth twice daily, atorvastatin calcium 80

mg at bedtime, carvedilol 3.125 mg by mouth daily, artificial tears one drop to

the right eye twice daily, Colace or docusate sodium 100 mg by mouth daily,

sennoside or senna 2 tablets by mouth twice daily, Ventolin inhaler 2 puffs via

inhalation every 4 hours as needed for shortness of breath, Norco 5/325 mg 1 by

mouth every 4 hours as needed for pain.



ALLERGIES:  THE PATIENT IS ALLERGIC TO PENICILLIN.



REVIEW OF SYSTEMS:

CONSTITUTIONAL:  There is no fever, no chills, no diaphoresis.

HEENT:  There is no headache or sore throat.

CARDIOVASCULAR SYSTEM:  There is no chest pain or orthopnea.

RESPIRATORY SYSTEM:  There is no shortness of breath or cough.

GASTROINTESTINAL SYSTEM:  There is no nausea, no vomiting.  No abdominal pain,

diarrhea or constipation.

NEUROLOGICAL SYSTEM:  Altered mental status noted.  No dizziness.

MUSCULOSKELETAL SYSTEM:  There is no joint pain or swelling.

DERMATOLOGICAL SYSTEM:  There is report of skin breakdown in the leg and sacral

area.

GENITOURINARY SYSTEM:  There is no dysuria, hematuria or flank pain.

Rest of system review is normal.



PHYSICAL EXAMINATION:

GENERAL:  At the time of exam, the patient was found to be alert, oriented to

person only and not in acute distress.

VITAL SIGNS:  Show normal temperature with pulse of 121, respiration of 18,

blood pressure 119/53.

HEENT:  Shows pupils to be equal, round, reactive to light and accommodating. 

Extraocular muscles are intact.

NECK:  Neck is supple with no JVD or carotid bruit.

CARDIOVASCULAR SYSTEM:  Showed normal first and second heart sounds with no

gallops or murmurs.

RESPIRATORY SYSTEM:  Showed bilateral scattered crackles in both lung fields.

GASTROINTESTINAL SYSTEM:  Showed abdomen to be full, soft, nontender with no

organomegaly or rigidity.

NEUROLOGIC:  Showed the patient who is confused with no focal deficit.

MUSCULOSKELETAL SYSTEM:  Showed no joint swelling or tenderness.

DERMATOLOGICAL SYSTEM:  Showed skin excoriation in the sacral area and in the

left leg.

GENITOURINARY SYSTEM:  Showed no costovertebral angle tenderness.



PERTINENT LABORATORY DATA AND IMAGING STUDIES:  The patient had CT of the

abdomen and pelvis with contrast done that shows mucosal thickening of the colon

indicating colitis and also there is finding of compression deformities of T12

and L1 which they say could be acute.  There is atelectasis at the lung bases,

right lower lobe infiltrate, and bilateral pleural effusion.  There is also

finding of cholelithiasis with no specific evidence of cholecystitis or

bilateral ductal dilatation.  The patient also had CT of the head without

contrast that shows chronic involutional and ischemic changes with no hemorrhage

or edema found.  There is sphenoid sinusitis also noted.  The patient had chest

x-ray done, which shows that there are bilateral lower lobe infiltrates and

small effusion.  There is no finding of any pneumothorax according to the

radiologist.



Lab results; the patient has CBC done with low white count of 2.9, low

hemoglobin of 7.6 and low hematocrit of 22.7 with low platelet of 47,000

suggestive of pancytopenia, most likely due to multiple myeloma.  The patient

has coagulation studies done that shows elevated PT of 19.3 and elevated INR of

1.5 with high D-dimer of 3469 that warranted the ordering of V/Q scan, which is

yet to be done.  The patient's chemistry shows a slightly elevated sodium level

of 146 with low potassium level of 3.4, elevated BUN of 33 and elevated

creatinine of 1.8 with elevated glucose level of 155, low calcium level of 7.5

and high total bilirubin of 1.9.  Liver transaminases came back normal.  The

patient's cardiac enzyme shows slightly elevated total CPK of 301 with elevated

troponin level of 0.180.  The patient's brain natriuretic peptide level is high

with a value of 9064 and albumin level is low with a value of 2.3.  Rest of

chemistry is unremarkable.



DIAGNOSES:

1.  Altered mental status.

2.  Colitis.

3.  Sacral decubitus ulcer.

4.  Right lower lobe pneumonia.

5.  Elevated troponin level.

6.  Acute kidney injury.



PLAN:

1.  The patient will be admitted to telemetry.

2.  The patient will have cardiac enzymes involving troponin, total CK, and

CK-MB checked q. 6 hours x 2 more level.

3.  The patient will have Nephrology consult with Dr. José for

evaluation of renal insufficiency or acute kidney injury.

4.  The patient will have wound care nurse consult for management of sacral

decubitus ulcer.

5.  The patient will be on p.r.n. medications like Tylenol 650 mg by mouth every

4 hours for fever and headache and Zofran 4 mg IV every 8 hours for nausea and

vomiting.

6.  The patient will be on IV Levaquin 750 mg daily.

7.  The patient will have a V/Q scan done as ordered by the Emergency Room

physician in the morning.

8.  The patient will be on heparin 5000 units subQ q. 12 hours for DVT

prophylaxis.

9.  The patient's home medications will be started as shown in the medication

reconciliation section.





DD: 09/24/2018 04:01

DT: 09/24/2018 05:17

JOB# [id number]  [id number]

OCN/NTS

## 2018-09-24 NOTE — XRAY REPORT
FINAL REPORT



PROCEDURE:  XR CHEST 1V AP



TECHNIQUE:  Chest radiograph anteroposterior view. CPT 62843







HISTORY:  tachycardia, elevated d-dimer 



COMPARISON:  06/23/2018



FINDINGS:  

Heart is enlarged. 



There are bilateral lower lobe infiltrates and small effusions.

There is no pneumothorax. Bony and soft tissue structures are

normal. 



IMPRESSION:  





Heart is enlarged. 



There are bilateral lower lobe infiltrates and small effusions.

There is no pneumothorax. 



.

## 2018-09-24 NOTE — EVENT NOTE
Date: 09/24/18


Patient seen and examined medical records reviewed


Patient nursing home resident was admitted through ER this early morning with 

altered level of consciousness


Patient has multiple medical problems, full CODE STATUS.





When I evaluated the patient, patient is noncommunicative, sick looking


Tachycardic with multiple PVCs, on Cardizem drip


No family available, limited history from ER and admitting physician's note





Patient's vital signs reviewed.





Physical exam;





Frail elderly female patient, awake, noncommunicative


In mild distress


Vital signs noted





Physical exam;


Constitutional; edematous, very based


Head and ENT; atraumatic and normocephalic, no oral lesions or ulcers


Neck; supple no lymphadenopathy JVD or thyromegaly


Lungs; bilateral air entry is reduced occasional coarse breath sounds on right 

side


CVS; S1-S2, no murmur, tachycardia


Abdomen; soft nontender bowel sounds present


Bilateral lower extremities; edema, no calf asymmetry or calf tenderness


Left upper extremity; at emeritus


CNS; noncommunicative


Psych; noncommunicative


Skin; age-related changes








Assessment and plan:


--Metabolic encephalopathy; multifactorial, dementia


Underlying sepsis, hypotension, pneumonia


Continue supportive care, treat underlying  disease process





--Worsening shortness of breath;


 O2 titrate O2 sats to more than 90%, given neb





--Right lower lobe pneumonia; present on admission


 IV Levaquin, cultures supportive care


Possible aspiration pneumonia, speech and swallow eval





--Acute kidney injury; probably secondary to ATN


Gentle hydration, nephrology following





--Severe hypokalemia; replenishment protocol





--Hypomagnesemia; replenishment protocol





--Elevated D dimers, negative V/Q scan





--Lower extremity, left upper extremity swelling; venous Doppler


To rule out DVT in the setting of elevated D dimers.





--Acute colitis; IV Levaquin, renal dose


Supportive care, consult GI as needed





--Anemia; closely monitor H&H, transfuse as needed


Stool for occult blood[hemoglobin in 6/18 was 11.4]





--Dementia; supportive care





--History of hypertension; patient is currently hypotensive





--Sinus tachycardia/PVCs; patient is on Cardizem drip


Closely monitor





--Positive cardiac enzymes; probably nonspecific


Multiple risk factors, cardiology evaluation





--Severe malnutrition; hypoalbuminemia; supportive care nutrition consult





--DVT prophylaxis; SCDs


--Full CODE STATUS





Clinical care time 35 minutes

## 2018-09-24 NOTE — NUCLEAR MEDICINE REPORT
FINAL REPORT



PROCEDURE:  NM LUNG SCAN PERF/VENT



TECHNIQUE:  5.0 mCi Tc-99m MAA was injected IV for pulmonary

perfusion imaging in multiple projections. 15 MCi XE-133 was

inhaled for pulmonary ventilation imaging in multiple

projections. Injection site: RIGHT antecubital fossa. CPT 96871



REGULATORY GUIDELINES: The patient was released based upon

guidelines established in [place] State Regulations for Protection

Against Radiation, Chapter 5225-42-87-35, Release of Individuals

Containing Radioactive Drugs or Implants. 







HISTORY:  tachycardia, elevated d-dimer 



COMPARISON:  No prior studies are available for comparison.



FINDINGS:  

Perfusion: No defects .



Ventilation: No defects .







IMPRESSION:  

Normal Examination

## 2018-09-24 NOTE — CONSULTATION
History of Present Illness





- Reason for Consult


Consult date: 09/24/18


acute renal failure, chronic renal failure


Requesting physician: AMY J KOCHERLA





- History of Present Illness








The patient is an 78-year-old female presenting with a chief complaint of 

altered mental status.  The patient is a resident at a nursing home and family 

states the patient last known well time was 09/21/2018.  The daughter states 

she did not check on the patient yesterday but today at 11:30 shows that the 

patient was not behaving like herself that she was not speaking and appears 

over her eyes rolled in the back of her head.  Patient opens eyes and makes eye 

contact but will not answer questions to provide history.





- Past Medical History


Hx Hypertension: Yes


Hx Heart Attack/AMI: Yes


Hx Congestive Heart Failure: Yes


Hx Diabetes: Yes (diet)


Hx of Cancer: Yes (multiple myeloma)


Additional medical history: Heart problems; multiple myeloma





- Surgical History


Hx Coronary Stent: Yes


Hx Appendectomy: Yes


Additional Surgical History: Pacemaker, Hysterectomy, Bladder surgery; jaw 

surgery





- Family History


Family history: no significant





- Social History


Smoking Status: Former Smoker (none 20 years)


Substance Use Type: None











Medications and Allergies


 Allergies











Allergy/AdvReac Type Severity Reaction Status Date / Time


 


Penicillins Allergy  Rash Verified 01/21/15 12:13











 Home Medications











 Medication  Instructions  Recorded  Confirmed  Last Taken  Type


 


Aspirin [Aspirin BABY CHEW TAB] 81 mg PO QDAY 01/21/15 09/24/18 Unknown History


 


DULoxetine [Cymbalta] 60 mg PO DAILY 01/21/15 09/24/18 Unknown History


 


Gabapentin [Neurontin] 300 mg PO BID 01/21/15 09/24/18 Unknown History


 


Topiramate [Topamax TAB] 25 mg PO HS 01/21/15 09/24/18 Unknown History


 


amLODIPine [Norvasc] 5 mg PO DAILY 01/21/15 09/24/18 Unknown History


 


ALPRAZolam [Xanax TAB] 0.25 mg PO DAILY PRN 06/11/18 09/24/18 Unknown History


 


Acidophilus Lactobacillus 1 cap PO BID 06/11/18 09/24/18 Unknown History


 


Atorvastatin Calcium 80 mg PO HS 06/11/18 09/24/18 Unknown History


 


Carvedilol [Coreg] 3.125 mg PO DAILY 06/11/18 09/24/18 Unknown History


 


Dextran 70/Hypromellose 1 drop OU BID 06/11/18 09/24/18 Unknown History





[Artificial Tears]     


 


Docusate Sodium [Colace CAP] 100 mg PO DAILY 06/11/18 09/24/18 Unknown History


 


Norvasc 5 mg PO DAILY 06/11/18 09/24/18 Unknown History


 


Sennosides [Senna] 2 tab PO BID 06/11/18 09/24/18 Unknown History


 


Ventolin Hfa 2 puff INHALATION Q4H PRN 06/11/18 09/24/18 Unknown History


 


Xanax 0.25 mg PO HS 06/11/18 09/24/18 Unknown History


 


HYDROcodone/APAP 5-325 [Norco 1 each PO Q4H PRN #10 tablet 06/23/18 09/24/18 

Unknown Rx





5-325 mg TAB]     











Active Meds: 


Active Medications





Acetaminophen (Tylenol)  650 mg PO Q4H PRN


   PRN Reason: Fever >101


Albuterol (Proventil)  2.5 mg IH Q4HRT PRN


   PRN Reason: Shortness Of Breath


Alprazolam (Xanax)  0.25 mg PO DAILY PRN


   PRN Reason: Anxiety


Aspirin (Baby Aspirin)  81 mg PO QDAY [facility]


Atorvastatin Calcium (Lipitor)  80 mg PO QHS [facility]


Carvedilol (Coreg)  3.125 mg PO DAILY [facility]


Docusate Sodium (Colace)  100 mg PO DAILY [facility]


Duloxetine HCl (Cymbalta)  60 mg PO DAILY [facility]


Heparin Sodium (Porcine) (Heparin)  5,000 unit SUB-Q Q12HR [facility]


   Last Admin: 09/24/18 11:00 Dose:  5,000 unit


Diltiazem HCl 100 mg/ Dextrose  100 mls @ 5 mls/hr IV AS DIRECT [facility]; Protocol


   Last Admin: 09/23/18 23:55 Dose:  5 mg/hr, 5 mls/hr


Levofloxacin/Dextrose (Levaquin 250mg/50ml)  250 mg in 50 mls @ 50 mls/hr IV 

Q24HR [facility]; Protocol


Magnesium Sulfate (Magnesium Sulfate 2gm/50ml)  2 gm in 50 mls @ 25 mls/hr IV 

ONCE ONE


   Stop: 09/24/18 18:29


Morphine Sulfate (Morphine)  2 mg IV Q4H PRN


   PRN Reason: Pain, Moderate (4-6)


   Last Admin: 09/24/18 03:22 Dose:  2 mg


Ondansetron HCl (Zofran)  4 mg IV Q8H PRN


   PRN Reason: Nausea And Vomiting


Topiramate (Topamax)  25 mg PO HS [facility]











Review of Systems


ROS unobtainable: due to mental status





Exam





- Vital Signs


Vital signs: 


 Vital Signs











Pulse Resp Pulse Ox


 


 165 H  22   98 


 


 09/23/18 23:36  09/23/18 23:36  09/23/18 23:36














- Physical Exam


Narrative exam: 





GENERAL: The patient is well-developed well-nourished female lying on stretcher 

not appearing to be in acute distress. []


HEENT: Normocephalic.  Atraumatic.  Extraocular motions are intact.  Patient 

has moist mucous membranes.


NECK: Supple.  Trachea midline


CHEST/LUNGS: Clear to auscultation.  There is no respiratory distress noted.


HEART/CARDIOVASCULAR: Irregular but rapid.  There is tachycardia.  There is no 

gallop rub or murmur.


ABDOMEN: Abdomen is soft, with tenderness to palpation in the right upper 

quadrant and left upper quadrant.  Patient has normal bowel sounds.  There is 

no abdominal distention.


SKIN: There is no rash. There is no diaphoresis.


NEURO: The patient is awake but does not answer questions.  Patient will not 

follow commands for neurologic exam.


MUSCULOSKELETAL:  There is no evidence of acute injury.





Results





- Lab Results





 09/24/18 00:05





 09/24/18 00:05


 Most recent lab results











Calcium  7.5 mg/dL (8.4-10.2)  L  09/24/18  00:05    














Assessment and Plan





Impressson:


* SURAJ on ckd


* pyuria


* AMS


* RLL PNA


* Colitis--acute


* Dementia


* elec imbalance


* h/o HTN








Plan:


* iv abx and ivfs


* strict i/os


* daily lytes


* elec management and repletion as need


* avoid nephrotoxins


* no indication for RRT at this time

## 2018-09-25 LAB
BUN SERPL-MCNC: 46 MG/DL (ref 7–17)
BUN/CREAT SERPL: 19 %
CALCIUM SERPL-MCNC: 7.1 MG/DL (ref 8.4–10.2)
HEMOLYSIS INDEX: 1

## 2018-09-25 PROCEDURE — 0BH17EZ INSERTION OF ENDOTRACHEAL AIRWAY INTO TRACHEA, VIA NATURAL OR ARTIFICIAL OPENING: ICD-10-PCS | Performed by: INTERNAL MEDICINE

## 2018-09-25 PROCEDURE — 5A1935Z RESPIRATORY VENTILATION, LESS THAN 24 CONSECUTIVE HOURS: ICD-10-PCS | Performed by: INTERNAL MEDICINE

## 2018-09-25 PROCEDURE — 4A033R1 MEASUREMENT OF ARTERIAL SATURATION, PERIPHERAL, PERCUTANEOUS APPROACH: ICD-10-PCS | Performed by: INTERNAL MEDICINE

## 2018-09-25 RX ADMIN — METOPROLOL TARTRATE SCH: 25 TABLET, FILM COATED ORAL at 06:39

## 2018-09-25 RX ADMIN — MORPHINE SULFATE PRN MG: 2 INJECTION, SOLUTION INTRAMUSCULAR; INTRAVENOUS at 01:32

## 2018-09-25 RX ADMIN — NOREPINEPHRINE BITARTRATE SCH MLS/HR: 16 INJECTION, SOLUTION INTRAVENOUS at 22:15

## 2018-09-25 RX ADMIN — METOPROLOL TARTRATE SCH: 25 TABLET, FILM COATED ORAL at 12:00

## 2018-09-25 RX ADMIN — HEPARIN SODIUM SCH UNIT: 5000 INJECTION, SOLUTION INTRAVENOUS; SUBCUTANEOUS at 11:23

## 2018-09-25 RX ADMIN — METOPROLOL TARTRATE SCH MG: 25 TABLET, FILM COATED ORAL at 00:30

## 2018-09-25 RX ADMIN — METOPROLOL TARTRATE SCH: 25 TABLET, FILM COATED ORAL at 18:55

## 2018-09-25 NOTE — PROGRESS NOTE
Assessment and Plan


Assessment and plan: 


Assessment and plan:


--Metabolic encephalopathy; multifactorial, dementia


Underlying sepsis, hypotension, pneumonia


Continue supportive care, treat underlying  disease process





--Worsening shortness of breath;


 O2 titrate O2 sats to more than 90%, given neb





--Right lower lobe pneumonia; present on admission


 IV Levaquin, cultures supportive care


Possible aspiration pneumonia, speech and swallow eval





--Acute kidney injury; probably secondary to ATN


Gentle hydration, nephrology following





--Severe hypokalemia; potassium improved to 3.4


Replace per protocol and monitor levels





--Hypomagnesemia; replenishment protocol





--Elevated D dimers, negative V/Q scan





--Lower extremity, venous Doppler positive for chronic DVT





--Acute colitis; IV Levaquin, renal dose


Supportive care, consult GI as needed





--Anemia; closely monitor H&H, transfuse as needed


Stool for occult blood[hemoglobin in 6/18 was 11.4]





--Dementia; supportive care





--History of hypertension; patient is currently hypotensive





--Sinus tachycardia/PVCs; patient is on Cardizem drip


Closely monitor





--Positive cardiac enzymes; probably nonspecific


Multiple risk factors, cardiology evaluation





--Severe malnutrition; hypoalbuminemia; supportive care nutrition consult





--DVT prophylaxis; SCDs





--Full CODE STATUS





Patient is critically ill with multiple medical problems


Prognosis guarded








Clinical care time 35 minutes











History


Interval history: 


Patient seen and examined medical records reviewed


Patient is noncommunicative sick looking


Hypotensive


Nurse reports coffee-ground emesis, GI already following





Vital signs reviewed





Hospitalist Physical





- Constitutional


Vitals: 


 











Temp Pulse Resp BP Pulse Ox


 


 97.9 F   93 H  20   109/51   94 


 


 09/25/18 11:02  09/25/18 11:19  09/25/18 11:02 09/25/18 11:02 09/25/18 14:00











General appearance: Present: mild distress, well-nourished, other (confused, 

lethargic, no apparent distress)





- EENT


Eyes: Present: PERRL, EOM intact





- Neck


Neck: Present: supple





- Respiratory


Respiratory effort: normal


Respiratory: right: rhonchi, bilateral: diminished





- Cardiovascular


Rhythm: regular


Heart Sounds: Present: S1 & S2





- Extremities


Extremity abnormal: edema (bilateral lower extremities and left upper extremity)





- Abdominal


General gastrointestinal: soft, non-tender, non-distended, normal bowel sounds





- Integumentary


Integumentary: Present: clear, warm





- Psychiatric


Psychiatric: other (noncommunicative unresponsive)





- Neurologic


Neurologic: other (noncommunicative)





Results





- Labs


CBC & Chem 7: 


 09/24/18 00:05





 09/25/18 06:07


Labs: 


 Laboratory Last Values











WBC  2.9 K/mm3 (4.5-11.0)  L  09/24/18  00:05    


 


RBC  2.51 M/mm3 (3.65-5.03)  L  09/24/18  00:05    


 


Hgb  7.6 gm/dl (10.1-14.3)  L  09/24/18  00:05    


 


Hct  22.7 % (30.3-42.9)  L  09/24/18  00:05    


 


MCV  91 fl (79-97)   09/24/18  00:05    


 


MCH  30 pg (28-32)   09/24/18  00:05    


 


MCHC  33 % (30-34)   09/24/18  00:05    


 


RDW  17.8 % (13.2-15.2)  H  09/24/18  00:05    


 


Plt Count  47 K/mm3 (140-440)  L  09/24/18  00:05    


 


Add Manual Diff  Complete   09/24/18  00:05    


 


Total Counted  100   09/24/18  00:05    


 


Seg Neuts % (Manual)  43.0 % (40.0-70.0)   09/24/18  00:05    


 


Band Neutrophils %  30.0 %  09/24/18  00:05    


 


Lymphocytes % (Manual)  1.0 % (13.4-35.0)  L  09/24/18  00:05    


 


Reactive Lymphs % (Man)  0 %  09/24/18  00:05    


 


Monocytes % (Manual)  2.0 % (0.0-7.3)   09/24/18  00:05    


 


Eosinophils % (Manual)  0 % (0.0-4.3)   09/24/18  00:05    


 


Basophils % (Manual)  1.0 % (0.0-1.8)   09/24/18  00:05    


 


Metamyelocytes %  17.0 %  09/24/18  00:05    


 


Myelocytes %  4.0 %  09/24/18  00:05    


 


Promyelocytes %  2.0 %  09/24/18  00:05    


 


Blast Cells %  0 %  09/24/18  00:05    


 


Nucleated RBC %  5.0 % (0.0-0.9)  H  09/24/18  00:05    


 


Seg Neutrophils # Man  1.2 K/mm3 (1.8-7.7)  L  09/24/18  00:05    


 


Band Neutrophils #  0.9 K/mm3  09/24/18  00:05    


 


Lymphocytes # (Manual)  0.0 K/mm3 (1.2-5.4)  L  09/24/18  00:05    


 


Abs React Lymphs (Man)  0.0 K/mm3  09/24/18  00:05    


 


Monocytes # (Manual)  0.1 K/mm3 (0.0-0.8)   09/24/18  00:05    


 


Eosinophils # (Manual)  0.0 K/mm3 (0.0-0.4)   09/24/18  00:05    


 


Basophils # (Manual)  0.0 K/mm3 (0.0-0.1)   09/24/18  00:05    


 


Metamyelocytes #  0.5 K/mm3  09/24/18  00:05    


 


Myelocytes #  0.1 K/mm3  09/24/18  00:05    


 


Promyelocytes #  0.1 K/mm3  09/24/18  00:05    


 


Blast Cells #  0.0 K/mm3  09/24/18  00:05    


 


WBC Morphology  Not Reportable   09/24/18  00:05    


 


Hypersegmented Neuts  Not Reportable   09/24/18  00:05    


 


Hyposegmented Neuts  Not Reportable   09/24/18  00:05    


 


Hypogranular Neuts  Not Reportable   09/24/18  00:05    


 


Smudge Cells  Not Reportable   09/24/18  00:05    


 


Toxic Granulation  Not Reportable   09/24/18  00:05    


 


Toxic Vacuolation  Not Reportable   09/24/18  00:05    


 


Dohle Bodies  Not Reportable   09/24/18  00:05    


 


Pelger-Huet Anomaly  Not Reportable   09/24/18  00:05    


 


Preston Rods  Not Reportable   09/24/18  00:05    


 


Platelet Estimate  Appears decreased   09/24/18  00:05    


 


Clumped Platelets  Not Reportable   09/24/18  00:05    


 


Plt Clumps, EDTA  Not Reportable   09/24/18  00:05    


 


Large Platelets  Not Reportable   09/24/18  00:05    


 


Giant Platelets  Not Reportable   09/24/18  00:05    


 


Platelet Satelliting  Not Reportable   09/24/18  00:05    


 


Plt Morphology Comment  Not Reportable   09/24/18  00:05    


 


RBC Morphology  Not Reportable   09/24/18  00:05    


 


Dimorphic RBCs  Not Reportable   09/24/18  00:05    


 


Polychromasia  Not Reportable   09/24/18  00:05    


 


Hypochromasia  Not Reportable   09/24/18  00:05    


 


Poikilocytosis  Not Reportable   09/24/18  00:05    


 


Anisocytosis  1+   09/24/18  00:05    


 


Microcytosis  1+   09/24/18  00:05    


 


Macrocytosis  Not Reportable   09/24/18  00:05    


 


Spherocytes  Not Reportable   09/24/18  00:05    


 


Pappenheimer Bodies  Not Reportable   09/24/18  00:05    


 


Sickle Cells  Not Reportable   09/24/18  00:05    


 


Target Cells  Not Reportable   09/24/18  00:05    


 


Tear Drop Cells  1+   09/24/18  00:05    


 


Ovalocytes  Not Reportable   09/24/18  00:05    


 


Helmet Cells  Not Reportable   09/24/18  00:05    


 


[place] Bodies  Not Reportable   09/24/18  00:05    


 


Cabot Rings  Not Reportable   09/24/18  00:05    


 


Arcadio Cells  Not Reportable   09/24/18  00:05    


 


Bite Cells  Not Reportable   09/24/18  00:05    


 


Crenated Cell  Not Reportable   09/24/18  00:05    


 


Elliptocytes  Not Reportable   09/24/18  00:05    


 


Acanthocytes (Spur)  Not Reportable   09/24/18  00:05    


 


Rouleaux  Not Reportable   09/24/18  00:05    


 


Hemoglobin C Crystals  Not Reportable   09/24/18  00:05    


 


Schistocytes  Not Reportable   09/24/18  00:05    


 


Malaria parasites  Not Reportable   09/24/18  00:05    


 


Mick Bodies  Not Reportable   09/24/18  00:05    


 


Hem Pathologist Commnt  No   09/24/18  00:05    


 


PT  19.3 Sec. (12.2-14.9)  H  09/24/18  00:05    


 


INR  1.57  (0.87-1.13)  H  09/24/18  00:05    


 


APTT  36.4 Sec. (24.2-36.6)   09/24/18  00:05    


 


D-Dimer  3469.10 ng/mlDDU (0-234)  H  09/24/18  00:05    


 


Sodium  141 mmol/L (137-145)   09/25/18  06:07    


 


Potassium  3.4 mmol/L (3.6-5.0)  L  09/25/18  06:07    


 


Chloride  103.7 mmol/L ()   09/25/18  06:07    


 


Carbon Dioxide  16 mmol/L (22-30)  L  09/25/18  06:07    


 


Anion Gap  25 mmol/L  09/25/18  06:07    


 


BUN  46 mg/dL (7-17)  H  09/25/18  06:07    


 


Creatinine  2.4 mg/dL (0.7-1.2)  H  09/25/18  06:07    


 


Estimated GFR  24 ml/min  09/25/18  06:07    


 


BUN/Creatinine Ratio  19 %  09/25/18  06:07    


 


Glucose  232 mg/dL ()  H  09/25/18  06:07    


 


POC Glucose  222  ()  H  09/25/18  05:03    


 


Lactic Acid  3.30 mmol/L (0.7-2.0)  H*  09/25/18  09:49    


 


Calcium  7.1 mg/dL (8.4-10.2)  L  09/25/18  06:07    


 


Magnesium  1.80 mg/dL (1.7-2.3)   09/25/18  06:07    


 


Total Bilirubin  1.90 mg/dL (0.1-1.2)  H  09/24/18  00:05    


 


AST  29 units/L (5-40)   09/24/18  00:05    


 


ALT  17 units/L (7-56)   09/24/18  00:05    


 


Alkaline Phosphatase  73 units/L ()   09/24/18  00:05    


 


Total Creatine Kinase  327 units/L ()  H  09/24/18  12:08    


 


CK-MB (CK-2)  3.1 ng/mL (0.0-4.0)   09/24/18  12:08    


 


CK-MB (CK-2) Rel Index  0.9  (0-4)   09/24/18  12:08    


 


Troponin T  0.158 ng/mL (0.00-0.029)  H*  09/24/18  12:08    


 


NT-Pro-B Natriuret Pep  9064 pg/mL (0-900)  H  09/24/18  00:05    


 


Total Protein  4.6 g/dL (6.3-8.2)  L  09/24/18  00:05    


 


Albumin  2.3 g/dL (3.9-5)  L  09/24/18  00:05    


 


Albumin/Globulin Ratio  1.0 %  09/24/18  00:05    


 


Triglycerides  181 mg/dL (2-149)  H  09/24/18  00:05    


 


Cholesterol  93 mg/dL ()   09/24/18  00:05    


 


LDL Cholesterol Direct  28 mg/dL ()  L  09/24/18  00:05    


 


HDL Cholesterol  35 mg/dL (40-59)  L  09/24/18  00:05    


 


Cholesterol/HDL Ratio  2.65 %  09/24/18  00:05    


 


Lipase  7 units/L (13-60)  L  09/24/18  00:05    


 


Urine Color  Shawna  (Yellow)   09/24/18  13:20    


 


Urine Turbidity  Cloudy  (Clear)   09/24/18  13:20    


 


Urine pH  5.0  (5.0-7.0)   09/24/18  13:20    


 


Ur Specific Gravity  1.027  (1.003-1.030)   09/24/18  13:20    


 


Urine Protein  100 mg/dl mg/dL (Negative)   09/24/18  13:20    


 


Urine Glucose (UA)  Neg mg/dL (Negative)   09/24/18  13:20    


 


Urine Ketones  Neg mg/dL (Negative)   09/24/18  13:20    


 


Urine Blood  Mod  (Negative)   09/24/18  13:20    


 


Urine Nitrite  Neg  (Negative)   09/24/18  13:20    


 


Urine Bilirubin  Neg  (Negative)   09/24/18  13:20    


 


Urine Urobilinogen  2.0 mg/dL (<2.0)   09/24/18  13:20    


 


Ur Leukocyte Esterase  Neg  (Negative)   09/24/18  13:20    


 


Urine WBC (Auto)  18.0 /HPF (0.0-6.0)  H  09/24/18  13:20    


 


Urine RBC (Auto)  4.0 /HPF (0.0-6.0)   09/24/18  13:20    


 


Urine Bacteria (Auto)  4+ /HPF (Negative)   09/24/18  13:20    


 


Urine Mucus  Few /HPF  09/24/18  13:20

## 2018-09-25 NOTE — GASTROENTEROLOGY CONSULTATION
History of Present Illness





- Reason for Consult


Consult date: 09/25/18


colitis


Requesting physician: AMY J KOCHERLA





- History of Present Illness


Patient is a 79 y/o female nursing home resident with PMH of HTN, CAD, CHF, 

dementia, and multiple myeloma who presented to ED for an evaluation of AMS. 

She is currently being treated for metabolic encephalopathy, RLL penumonia (

possibel aspriation; SLP eval pending), SURAJ, acute heart failure, chronic DVT, 

paroxysmal A-fib, NSTEMI type II, and anemia. Cardiology and nephrology 

following. Upon admission, abd CT showed colitis to which GI has been 

consulted. This afternoon, patient was resting in bed w/o acute distress but 

noted to be confused and unable to provide history. History obtained via chart 

review and by speaking to her daughter (Greer Mak- [phone]) over the 

phone. Daughter reports patient having chronic anemia requiring blood 

transfusions in the past. She is unsure if she has ever had an endoscopic 

evaluation with EGD or colonoscopy. Nursing reports 1 episode of coffee-ground 

emesis today. No hematemesis, melena, or hematochezia. No evidence of abd pain 

or diarrhea. 























Past History


Past Medical History: CAD, diabetes, heart failure, hypertension, other (

multiple myeloma, dementia)


Past Surgical History: appendectomy, hysterectomy, Other (coronary stent, 

pacemaker, Bladder surgery, jaw surgery)


Social history: other (former smoker, nursing home resident)





Medications and Allergies


 Allergies











Allergy/AdvReac Type Severity Reaction Status Date / Time


 


Penicillins Allergy  Rash Verified 01/21/15 12:13











 Home Medications











 Medication  Instructions  Recorded  Confirmed  Last Taken  Type


 


Aspirin [Aspirin BABY CHEW TAB] 81 mg PO QDAY 01/21/15 09/24/18 Unknown History


 


DULoxetine [Cymbalta] 60 mg PO DAILY 01/21/15 09/24/18 Unknown History


 


Gabapentin [Neurontin] 300 mg PO BID 01/21/15 09/24/18 Unknown History


 


Topiramate [Topamax TAB] 25 mg PO HS 01/21/15 09/24/18 Unknown History


 


amLODIPine [Norvasc] 5 mg PO DAILY 01/21/15 09/24/18 Unknown History


 


ALPRAZolam [Xanax TAB] 0.25 mg PO DAILY PRN 06/11/18 09/24/18 Unknown History


 


Acidophilus Lactobacillus 1 cap PO BID 06/11/18 09/24/18 Unknown History


 


Atorvastatin Calcium 80 mg PO HS 06/11/18 09/24/18 Unknown History


 


Carvedilol [Coreg] 3.125 mg PO DAILY 06/11/18 09/24/18 Unknown History


 


Dextran 70/Hypromellose 1 drop OU BID 06/11/18 09/24/18 Unknown History





[Artificial Tears]     


 


Docusate Sodium [Colace CAP] 100 mg PO DAILY 06/11/18 09/24/18 Unknown History


 


Norvasc 5 mg PO DAILY 06/11/18 09/24/18 Unknown History


 


Sennosides [Senna] 2 tab PO BID 06/11/18 09/24/18 Unknown History


 


Ventolin Hfa 2 puff INHALATION Q4H PRN 06/11/18 09/24/18 Unknown History


 


Xanax 0.25 mg PO HS 06/11/18 09/24/18 Unknown History


 


HYDROcodone/APAP 5-325 [Norco 1 each PO Q4H PRN #10 tablet 06/23/18 09/24/18 

Unknown Rx





5-325 mg TAB]     











Active Meds: 


Active Medications





Acetaminophen (Tylenol)  650 mg PO Q4H PRN


   PRN Reason: Fever >101


Albuterol (Proventil)  2.5 mg IH Q4HRT PRN


   PRN Reason: Shortness Of Breath


Alprazolam (Xanax)  0.25 mg PO DAILY PRN


   PRN Reason: Anxiety


Aspirin (Baby Aspirin)  81 mg PO QDAY [facility]


Atorvastatin Calcium (Lipitor)  80 mg PO QHS Former[facility]


   Last Admin: 09/24/18 22:40 Dose:  80 mg


Docusate Sodium (Colace)  100 mg PO DAILY [facility]


Duloxetine HCl (Cymbalta)  60 mg PO DAILY Former[facility]


Heparin Sodium (Porcine) (Heparin)  5,000 unit SUB-Q Q12HR [facility]


   Last Admin: 09/25/18 11:23 Dose:  5,000 unit


Levofloxacin/Dextrose (Levaquin 250mg/50ml)  250 mg in 50 mls @ 50 mls/hr IV 

Q24HR COREEN; Protocol


   Last Admin: 09/25/18 11:41 Dose:  50 mls/hr


Potassium Chloride/Sodium Chloride (Ns 0.45/Kcl 20meq)  20 meq in 1,000 mls @ 

125 mls/hr IV AS DIRECT COREEN


Metoprolol Tartrate (Lopressor)  12.5 mg PO Q6HR [facility]


   Last Admin: 09/25/18 06:39 Dose:  Not Given


Morphine Sulfate (Morphine)  2 mg IV Q4H PRN


   PRN Reason: Pain, Moderate (4-6)


   Last Admin: 09/25/18 01:32 Dose:  2 mg


Ondansetron HCl (Zofran)  4 mg IV Q8H PRN


   PRN Reason: Nausea And Vomiting


   Last Admin: 09/24/18 17:12 Dose:  4 mg


Topiramate (Topamax)  25 mg PO HS [facility]


   Last Admin: 09/24/18 22:40 Dose:  25 mg











Review of Systems





- Review of Systems


ROS unobtainable: due to mental status





Exam





- Constitutional


Vital Signs: 


 











Temp Pulse Resp BP Pulse Ox


 


 97.9 F   93 H  20   109/51   94 


 


 09/25/18 11:02  09/25/18 11:19  09/25/18 11:02  09/25/18 11:02  09/25/18 14:00











General appearance: no acute distress, other (confused)





- Respiratory


Respiratory: bilateral: diminished





- Cardiovascular


Rhythm: irregularly irregular


Extremity abnormal: edema





- Gastrointestinal


General gastrointestinal: Present: soft, non-tender, non-distended, normal 

bowel sounds





- Labs


CBC & Chem 7: 


 09/24/18 00:05





 09/25/18 06:07


Lab Results: 


 Laboratory Results - last 24 hr











  09/24/18 09/25/18 09/25/18





  21:07 05:03 06:07


 


Sodium    141


 


Potassium    3.4 L


 


Chloride    103.7


 


Carbon Dioxide    16 L


 


Anion Gap    25


 


BUN    46 H


 


Creatinine    2.4 H


 


Estimated GFR    24


 


BUN/Creatinine Ratio    19


 


Glucose    232 H


 


POC Glucose  264 H  222 H 


 


Lactic Acid   


 


Calcium    7.1 L


 


Magnesium    1.80














  09/25/18





  09:49


 


Sodium 


 


Potassium 


 


Chloride 


 


Carbon Dioxide 


 


Anion Gap 


 


BUN 


 


Creatinine 


 


Estimated GFR 


 


BUN/Creatinine Ratio 


 


Glucose 


 


POC Glucose 


 


Lactic Acid  3.30 H*


 


Calcium 


 


Magnesium 














Assessment and Plan


1.anemia


 2.coffee ground emesis


-plt 47, INR 1.57


-HGB 7.6


-continue to monitor H/H and transfuse as needed


-currently on daily ASA and heparin


-etiology- most likely 2/2 chronic disease, however with episode of CGE today 

and potential need for long term anticoagulation will schedule for an EGD in am 

for further evaluation 


-NPO after MN


-hold am dose of heparin


-start on PPI


-continue supportive care


-will follow


3.colitis


-WBC 2.9


-afebrile


-abd CT showed nonspecific colitis


-etiology unclear


-clinically, patient is w/o s/s of abd pain or diarrhea


-recommend stool studies if diarrhea develops


-continue empiric antibiotics 


-continue supportive care

## 2018-09-25 NOTE — CAT SCAN REPORT
FINAL REPORT



PROCEDURE:  CT HEAD/BRAIN WO CON



TECHNIQUE:  Computerized tomography of the head was performed

without contrast material. 



HISTORY:  ams 



COMPARISON:  09/24/2018



FINDINGS:  

Skull and scalp: Normal.



Paranasal sinuses: There is opacification of the ethmoid and

sphenoid sinuses..



Ventricles and subarachnoid spaces: Normal.



Cerebrum: There is no evidence of acute intracranial hemorrhage,

hematoma or infarction. Mild atrophy is noted..



Cerebellum and brainstem: No evidence of hemorrhage, acute

infarction or mass.



Vasculature: Normal.



Comments: None.



IMPRESSION:  

There is no evidence of an acute intracranial process. Mild

atrophy.



Mild sinusitis involving the ethmoid and sphenoid sinuses.

## 2018-09-25 NOTE — PROGRESS NOTE
Assessment and Plan


Assessment:


Acute heart failure


Paroxysmal atrial fibrillation/atrial flutter


NSTEMI type II


? UTI / ? PNA / ? colitis / ? sepsis 


Altered mental status 


Pancytopenia 


Acute renal failure


Chronic DVT in left IJV





Plan:


Await echo. 


Pt may benefit from long term systemic anticoagulation in regards to atrial 

fibrillation. However, severe anemia and thrombocytopenia are concerning. Will 

not initiate systemic AC at this time. Await GI consultation and 

recommendations. 


Defer diuresis to nephrology. 





The patient has been seen in conjunction with Dr. Lugo who agrees with the 

assessment and plan of care. 








Subjective


Date of service: 09/25/18


Principal diagnosis: AMS; NSTEMI type II; AFib


Interval history: 


pt resting in bed, no apparent distress, remains confused. tele reviewed - in 

AFib with CVR on telemetry. 








Objective





  Last Vital Signs











Temp  97.9 F   09/25/18 11:02


 


Pulse  93 H  09/25/18 11:19


 


Resp  20   09/25/18 11:02


 


BP  109/51   09/25/18 11:02


 


Pulse Ox  92   09/25/18 11:02

















- Physical Examination


HEENT: Positive: PERRL


Neck: Positive: neck supple, trachea midline


Cardiac: Positive: irregularly irregular, S1/S2


Lungs: Positive: clear to auscultation


Neuro: Positive: Other (lethargic, confused)


Abdomen: Negative: Tender


Skin: Negative: Rash


Musculoskeletal: Fluid Collection (generalized )


Extremities: Present: +2 Edema (BLE)





- Labs and Meds


 Cardiac Enzymes











  09/24/18 Range/Units





  12:08 


 


CK-MB (CK-2)  3.1  (0.0-4.0)  ng/mL








 Comprehensive Metabolic Panel











  09/24/18 09/25/18 Range/Units





  14:55 06:07 


 


Sodium  142  141  (137-145)  mmol/L


 


Potassium  3.4 L  3.4 L  (3.6-5.0)  mmol/L


 


Chloride  105.4  103.7  ()  mmol/L


 


Carbon Dioxide  17 L  16 L  (22-30)  mmol/L


 


BUN  41 H  46 H  (7-17)  mg/dL


 


Creatinine  1.9 H  2.4 H  (0.7-1.2)  mg/dL


 


Glucose  235 H  232 H  ()  mg/dL


 


Calcium  7.2 L  7.1 L  (8.4-10.2)  mg/dL














- Imaging and Cardiology


EKG: report reviewed, image reviewed


Echo: pending





- Telemetry


EKG Rhythm: Atrial Fibrillation





- EKG


Sinus rhythms and dysrhythmias: sinus tachycardia

## 2018-09-25 NOTE — XRAY REPORT
FINAL REPORT



EXAM:  XR CHEST 1V AP



HISTORY:  intubation 



TECHNIQUE:  2 views of the chest.



PRIORS:  09/23/2018



FINDINGS:  

There is an endotracheal tube in place which appears adequately

positioned in the mid to distal trachea. The cardiomediastinal

silhouette appears normal. There is a new right midlung can

fluent opacity. There is a stable left basilar patchy infiltrate.

The right lung base appears better aerated. The bones and soft

tissues are unremarkable.



IMPRESSION:  

1. The endotracheal tube appears adequately positioned 



2. There is a new right midlung can fluid opacity consistent with

an infiltrate 



3. There is a stable left lower lobe infiltrate

## 2018-09-26 VITALS — DIASTOLIC BLOOD PRESSURE: 61 MMHG | SYSTOLIC BLOOD PRESSURE: 81 MMHG

## 2018-09-26 LAB
ANISOCYTOSIS BLD QL SMEAR: (no result)
BAND NEUTROPHILS # (MANUAL): 0 K/MM3
BUN SERPL-MCNC: 42 MG/DL (ref 7–17)
BUN/CREAT SERPL: 17 %
BURR CELLS BLD QL SMEAR: (no result)
CALCIUM SERPL-MCNC: 5.9 MG/DL (ref 8.4–10.2)
HCT VFR BLD CALC: 17.1 % (ref 30.3–42.9)
HEMOLYSIS INDEX: 5
HGB BLD-MCNC: 5.1 GM/DL (ref 10.1–14.3)
MCH RBC QN AUTO: 31 PG (ref 28–32)
MCHC RBC AUTO-ENTMCNC: 30 % (ref 30–34)
MCV RBC AUTO: 104 FL (ref 79–97)
MYELOCYTES # (MANUAL): 0 K/MM3
PLATELET # BLD: 30 K/MM3 (ref 140–440)
PROMYELOCYTES # (MANUAL): 0 K/MM3
RBC # BLD AUTO: 1.65 M/MM3 (ref 3.65–5.03)
TOTAL CELLS COUNTED BLD: 100

## 2018-09-26 PROCEDURE — 5A12012 PERFORMANCE OF CARDIAC OUTPUT, SINGLE, MANUAL: ICD-10-PCS | Performed by: INTERNAL MEDICINE

## 2018-09-26 RX ADMIN — NOREPINEPHRINE BITARTRATE SCH MLS/HR: 16 INJECTION, SOLUTION INTRAVENOUS at 05:52

## 2018-09-26 RX ADMIN — HEPARIN SODIUM SCH: 5000 INJECTION, SOLUTION INTRAVENOUS; SUBCUTANEOUS at 00:25

## 2018-09-26 RX ADMIN — METOPROLOL TARTRATE SCH: 25 TABLET, FILM COATED ORAL at 03:19

## 2018-09-26 RX ADMIN — DOPAMINE HYDROCHLORIDE IN DEXTROSE SCH MLS/HR: 3.2 INJECTION, SOLUTION INTRAVENOUS at 00:30

## 2018-09-26 RX ADMIN — DOPAMINE HYDROCHLORIDE IN DEXTROSE SCH: 3.2 INJECTION, SOLUTION INTRAVENOUS at 00:00

## 2018-09-26 NOTE — XRAY REPORT
FINAL REPORT



EXAM:  XR CHEST 1V AP



HISTORY:  intubation 



TECHNIQUE:  A portable supine view the chest was obtained and

compared to the earlier study of 09/25/2018.



FINDINGS:  

The tip of the ET tube is 2 cm above the ivett. The heart size

is normal. The lungs remain diffusely congested. There patchy

airspace disease in the right juxtahilar area and left lung base

with a right-sided effusion. The bones and soft tissues otherwise

do not show any acute changes.



IMPRESSION:  

Tip of the ET tube 2 cm above the ivett.



Stable pulmonary vascular congestion with bilateral airspace

disease and right-sided effusion.

## 2018-09-26 NOTE — VASCULAR LAB REPORT
LEFT UPPER EXTREMITY VENOUS DUPLEX:



REASON FOR EXAM: Pain and swelling of the left upper extremity



COMMENTS ON THE LEFT:

Partially occluding chronic thrombus is seen in the internal jugular 

vein..  The remaining veins visualized are freely compressible without 

evidence of internal echogenicity.  Spontaneous and phasic flow is 

present in the subclavian vein proximally.



COMMENTS ON THE RIGHT:

A limited study of the jugular and subclavian veins shows no evidence 

of thrombus.



IMPRESSION:

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

No evidence of acute deep venous thrombosis in the left upper extremity.

## 2018-09-26 NOTE — DISCHARGE SUMMARY
Providers





- Providers


Date of Admission: 


09/24/18 04:33





Date of discharge: 09/26/18


Attending physician: 


AMY J KOCHERLA





 





09/24/18 03:14


Consult to Wound/ET Nurse [CONS] Routine 


   Reason For Exam: wound eval





09/24/18 06:00


Consult to Physician [CONS] Routine 


   Comment: 


   Consulting Provider: LISHA CINTRON


   Physician Instructions: 


   Reason For Exam: RENAL INSUFICIENCY





09/24/18 14:44


Consult to Physician [CONS] Routine 


   Comment: 


   Consulting Provider: OPHELIA MEZA


   Physician Instructions: 


   Reason For Exam: elevated troponin/SOB/





09/24/18 15:11


Consult to Dietitian/Nutrition [CONS] Routine 


   Physician Instructions: 


   Reason For Exam: 


   Reason for Consult: Malnutrition





09/25/18 11:45


Consult to Physician [CONS] Routine 


   Comment: 


   Consulting Provider: SWAPNA FORD


   Physician Instructions: 


   Reason For Exam: acute collitis





09/25/18 16:14


Speech Therapy Evaluation and Treat [CONS] Routine 


   Reason For Exam: aspiration pneumonia/swallow evaluation





09/26/18 00:45


Consult to Physician [CONS] Routine 


   Comment: 


   Consulting Provider: TONYA BENTLEY


   Physician Instructions: 


   Reason For Exam: cardiac arrest on [facility]. ventilation ,I.V dopamin











Primary care physician: 


PRIMARY CARE MD








Hospitalization


Condition: Serious


Disposition: DC-30 STILL A PATIENT





Exam





- Constitutional


Vitals: 


 











Temp Pulse Resp BP Pulse Ox


 


 94.1 F L  55 L  20   69/36   93 


 


 09/26/18 04:00  09/26/18 06:50  09/26/18 06:50  09/26/18 06:50  09/26/18 04:00














Plan


Follow up with: 


PRIMARY CARE,MD [Primary Care Provider] - 3-5 Days


Forms:  Accompanied Note

## 2018-09-26 NOTE — DEATH SUMMARY
Death Summary





- Providers


Date of service: 18


Consults: 


 





18 03:14


Consult to Wound/ET Nurse [CONS] Routine 


   Reason For Exam: wound eval





18 06:00


Consult to Physician [CONS] Routine 


   Comment: 


   Consulting Provider: LISHA CINTRON


   Physician Instructions: 


   Reason For Exam: RENAL INSUFICIENCY





18 14:44


Consult to Physician [CONS] Routine 


   Comment: 


   Consulting Provider: OPHELIA MEZA


   Physician Instructions: 


   Reason For Exam: elevated troponin/SOB/





18 15:11


Consult to Dietitian/Nutrition [CONS] Routine 


   Physician Instructions: 


   Reason For Exam: 


   Reason for Consult: Malnutrition





18 11:45


Consult to Physician [CONS] Routine 


   Comment: 


   Consulting Provider: SWAPNA FORD


   Physician Instructions: 


   Reason For Exam: acute collitis





18 16:14


Speech Therapy Evaluation and Treat [CONS] Routine 


   Reason For Exam: aspiration pneumonia/swallow evaluation





18 00:45


Consult to Physician [CONS] Routine 


   Comment: 


   Consulting Provider: TONYA BENTLEY


   Physician Instructions: 


   Reason For Exam: cardiac arrest on [facility]. ventilation ,I.V dopamin











Attending: 


AMY J KOCHERLA








- Death summary


Date of admission: 


18 04:33





Date of Death: 18 (07:40 am)


Reason for admission: Altered level of consciousness/metabolic increased 

adenopathy


Disposition: 


 on 18 at 7:20 am








- Final diagnosis


(1) Acute respiratory failure with hypoxia


Note: 


Final diagnosis:














(2) Cardiac arrest


Note: 


Final diagnosis:














(3) Septic shock


Note: 


Final diagnosis:














(4) Severe sepsis


Note: 


Final diagnosis:














(5) Lactic acidosis


Note: 


Final diagnosis:














(6) GI bleeding


Note: 


Final diagnosis:














(7) Severe anemia


Note: 


Final diagnosis:














(8) Acute kidney injury


Note: 


Final diagnosis:














(9) Metabolic acidosis


Note: 


Final diagnosis:














(10) Metabolic encephalopathy


Note: 


Final diagnosis:

## 2018-09-26 NOTE — EVENT NOTE
Date: 18


Patient had cardiac arrest


Code was called ,CPR was done per [facility] protocol


Patient could not be revived.Pronounced 


Time of death 7:40 am on 18


Family aware